# Patient Record
Sex: MALE | Race: WHITE
[De-identification: names, ages, dates, MRNs, and addresses within clinical notes are randomized per-mention and may not be internally consistent; named-entity substitution may affect disease eponyms.]

---

## 2017-12-31 ENCOUNTER — HOSPITAL ENCOUNTER (EMERGENCY)
Dept: HOSPITAL 26 - MED | Age: 82
LOS: 1 days | Discharge: ANOTHER HOSPITAL | End: 2018-01-01
Payer: MEDICARE

## 2017-12-31 VITALS — BODY MASS INDEX: 24.38 KG/M2 | HEIGHT: 72 IN | WEIGHT: 180 LBS

## 2017-12-31 VITALS — DIASTOLIC BLOOD PRESSURE: 83 MMHG | SYSTOLIC BLOOD PRESSURE: 170 MMHG

## 2017-12-31 DIAGNOSIS — I10: ICD-10-CM

## 2017-12-31 DIAGNOSIS — E11.9: ICD-10-CM

## 2017-12-31 DIAGNOSIS — J11.1: Primary | ICD-10-CM

## 2017-12-31 DIAGNOSIS — R79.89: ICD-10-CM

## 2017-12-31 DIAGNOSIS — F02.80: ICD-10-CM

## 2017-12-31 DIAGNOSIS — G30.9: ICD-10-CM

## 2017-12-31 DIAGNOSIS — E78.5: ICD-10-CM

## 2017-12-31 LAB
ALBUMIN FLD-MCNC: 3.2 G/DL (ref 3.4–5)
ANION GAP SERPL CALCULATED.3IONS-SCNC: 7.8 MMOL/L (ref 8–16)
AST SERPL-CCNC: 29 U/L (ref 15–37)
BILIRUB SERPL-MCNC: 0.3 MG/DL (ref 0–1)
BUN SERPL-MCNC: 15 MG/DL (ref 7–18)
CHLORIDE SERPL-SCNC: 105 MMOL/L (ref 98–107)
CO2 SERPL-SCNC: 32.4 MMOL/L (ref 21–32)
CREAT SERPL-MCNC: 1 MG/DL (ref 0.7–1.3)
ERYTHROCYTE [DISTWIDTH] IN BLOOD BY AUTOMATED COUNT: 12.3 % (ref 11.6–13.7)
GFR SERPL CREATININE-BSD FRML MDRD: (no result) ML/MIN (ref 90–?)
GLUCOSE SERPL-MCNC: 147 MG/DL (ref 74–106)
HCT VFR BLD AUTO: 42 % (ref 36–52)
HGB BLD-MCNC: 13.8 G/DL (ref 12–18)
LYMPHOCYTES NFR BLD MANUAL: 16 % (ref 20–46)
MCH RBC QN AUTO: 28 PG (ref 27–31)
MCHC RBC AUTO-ENTMCNC: 33 G/DL (ref 33–37)
MCV RBC AUTO: 87 FL (ref 80–94)
MONOCYTES NFR BLD MANUAL: 14 % (ref 5–12)
PLATELET # BLD AUTO: 150 K/UL (ref 140–450)
POTASSIUM SERPL-SCNC: 3.2 MMOL/L (ref 3.5–5.1)
PROTHROMBIN TIME: 10.9 SECS (ref 10.8–13.4)
RBC # BLD AUTO: 4.85 MIL/UL (ref 4.2–6.1)
SODIUM SERPL-SCNC: 142 MMOL/L (ref 136–145)
WBC # BLD AUTO: 3.9 K/UL (ref 4.8–10.8)

## 2017-12-31 PROCEDURE — 83880 ASSAY OF NATRIURETIC PEPTIDE: CPT

## 2017-12-31 PROCEDURE — 94640 AIRWAY INHALATION TREATMENT: CPT

## 2017-12-31 PROCEDURE — 84484 ASSAY OF TROPONIN QUANT: CPT

## 2017-12-31 PROCEDURE — 96361 HYDRATE IV INFUSION ADD-ON: CPT

## 2017-12-31 PROCEDURE — 99285 EMERGENCY DEPT VISIT HI MDM: CPT

## 2017-12-31 PROCEDURE — 85610 PROTHROMBIN TIME: CPT

## 2017-12-31 PROCEDURE — 82553 CREATINE MB FRACTION: CPT

## 2017-12-31 PROCEDURE — 83874 ASSAY OF MYOGLOBIN: CPT

## 2017-12-31 PROCEDURE — 93005 ELECTROCARDIOGRAM TRACING: CPT

## 2017-12-31 PROCEDURE — 82140 ASSAY OF AMMONIA: CPT

## 2017-12-31 PROCEDURE — 70450 CT HEAD/BRAIN W/O DYE: CPT

## 2017-12-31 PROCEDURE — 82550 ASSAY OF CK (CPK): CPT

## 2017-12-31 PROCEDURE — 82948 REAGENT STRIP/BLOOD GLUCOSE: CPT

## 2017-12-31 PROCEDURE — 80053 COMPREHEN METABOLIC PANEL: CPT

## 2017-12-31 PROCEDURE — 96365 THER/PROPH/DIAG IV INF INIT: CPT

## 2017-12-31 PROCEDURE — 87804 INFLUENZA ASSAY W/OPTIC: CPT

## 2017-12-31 PROCEDURE — 85025 COMPLETE CBC W/AUTO DIFF WBC: CPT

## 2017-12-31 PROCEDURE — 85730 THROMBOPLASTIN TIME PARTIAL: CPT

## 2017-12-31 PROCEDURE — 80305 DRUG TEST PRSMV DIR OPT OBS: CPT

## 2017-12-31 PROCEDURE — 96372 THER/PROPH/DIAG INJ SC/IM: CPT

## 2017-12-31 PROCEDURE — 81001 URINALYSIS AUTO W/SCOPE: CPT

## 2017-12-31 PROCEDURE — 87040 BLOOD CULTURE FOR BACTERIA: CPT

## 2017-12-31 PROCEDURE — 36415 COLL VENOUS BLD VENIPUNCTURE: CPT

## 2017-12-31 PROCEDURE — 83605 ASSAY OF LACTIC ACID: CPT

## 2017-12-31 PROCEDURE — 87086 URINE CULTURE/COLONY COUNT: CPT

## 2017-12-31 RX ADMIN — SODIUM CHLORIDE ONE MLS/HR: 9 INJECTION, SOLUTION INTRAVENOUS at 23:18

## 2017-12-31 RX ADMIN — IPRATROPIUM BROMIDE AND ALBUTEROL SULFATE ONE ML: .5; 3 SOLUTION RESPIRATORY (INHALATION) at 22:07

## 2017-12-31 RX ADMIN — WATER ONE MLS/HR: 1 INJECTION INTRAMUSCULAR; INTRAVENOUS; SUBCUTANEOUS at 22:10

## 2017-12-31 NOTE — NUR
82Y/M PT. BIBA TO [facility] C/O DIFFICULTY BREATHING, ALOC. PT. WAS ADMITTED AT 
[facility] FOR PNEUMONIA 2 WKS AGO. HX. HTN, DM, DEMENSIA. AAO X2, UNABLE TO 
AMBULATE AT THIS TIME. RESPIRATIONS ON NC 2 LPM, LABORED, TACHYPNIA 33 BPM, BL 
LUNG RHONCHI. O2 SAT 99%. SKIN WARM AND DRY. NO C/O PAIN AT THIS TIME. ER MD 
MADE AWARE OF PT. STATUS.

## 2017-12-31 NOTE — NUR
[facility] ATTEMPTED ONCE BY ME AND 3 TIMES BY POOJA PICHARDO AND WAS UNSECCESSFUL. DR. GABRIEL WAS NOTED AND STATED NO REASON TO TRY AGAIN

## 2018-01-01 VITALS — DIASTOLIC BLOOD PRESSURE: 49 MMHG | SYSTOLIC BLOOD PRESSURE: 105 MMHG

## 2018-01-01 LAB
APPEARANCE UR: CLEAR
BARBITURATES UR QL SCN: NEGATIVE NG/ML
BENZODIAZ UR QL SCN: NEGATIVE NG/ML
BILIRUB UR QL STRIP: NEGATIVE
BZE UR QL SCN: NEGATIVE NG/ML
CANNABINOIDS UR QL SCN: NEGATIVE NG/ML
CAOX CRY URNS QL MICRO: (no result) /HPF
COLOR UR: YELLOW
GLUCOSE UR STRIP-MCNC: NEGATIVE MG/DL
HGB UR QL STRIP: (no result)
LEUKOCYTE ESTERASE UR QL STRIP: NEGATIVE
NITRITE UR QL STRIP: NEGATIVE
OPIATES UR QL SCN: NEGATIVE NG/ML
PCP UR QL SCN: NEGATIVE NG/ML
PH UR STRIP: 5.5 [PH] (ref 5–9)
RBC #/AREA URNS HPF: (no result) /HPF (ref 0–5)
WBC,URINE: (no result) /HPF (ref 0–5)

## 2018-01-01 RX ADMIN — CLOPIDOGREL BISULFATE ONE MG: 75 TABLET, FILM COATED ORAL at 03:22

## 2018-01-01 RX ADMIN — ENOXAPARIN SODIUM ONE MG: 80 INJECTION SUBCUTANEOUS at 03:12

## 2018-01-01 RX ADMIN — DEXTROSE ONE MLS/HR: 50 INJECTION, SOLUTION INTRAVENOUS at 01:34

## 2018-01-01 RX ADMIN — ASPIRIN ONE MG: 325 TABLET, FILM COATED ORAL at 03:22

## 2018-01-01 NOTE — NUR
PATIENT RESTING IN BED, NO DISTRESS AT THIS TIME, VS STABLE, WAITING FOR 
TRANSPORT FROM [facility] TO TRANSFER PATIENT TO [facility].

## 2018-01-01 NOTE — NUR
Patient to be transferred to [facility] ROOM 442.  Is being transferred due 
to INSURANCE REASONS.  Receiving facility has accepting physician and available 
space. ER physician has signed transfer form.  Patient or responsible party has 
agreed to transfer and signed form.  Patient belongings inventoried and will be 
sent with patient.  Copy of nursing notes, lab reports, EKG, Physicians Orders 
and X-rays to be sent with patient.  Report called to  [phone] at 
receiving facility.  [facility] ambulance service Is transfering PATIENT.